# Patient Record
Sex: FEMALE | Race: WHITE | NOT HISPANIC OR LATINO | Employment: UNEMPLOYED | URBAN - METROPOLITAN AREA
[De-identification: names, ages, dates, MRNs, and addresses within clinical notes are randomized per-mention and may not be internally consistent; named-entity substitution may affect disease eponyms.]

---

## 2022-06-08 ENCOUNTER — HOSPITAL ENCOUNTER (EMERGENCY)
Facility: CLINIC | Age: 62
Discharge: HOME | End: 2022-06-08
Attending: EMERGENCY MEDICINE | Admitting: EMERGENCY MEDICINE
Payer: COMMERCIAL

## 2022-06-08 ENCOUNTER — APPOINTMENT (OUTPATIENT)
Dept: GENERAL RADIOLOGY | Facility: CLINIC | Age: 62
End: 2022-06-08
Attending: EMERGENCY MEDICINE
Payer: COMMERCIAL

## 2022-06-08 VITALS
HEART RATE: 84 BPM | TEMPERATURE: 98.3 F | OXYGEN SATURATION: 97 % | WEIGHT: 169.4 LBS | DIASTOLIC BLOOD PRESSURE: 100 MMHG | SYSTOLIC BLOOD PRESSURE: 135 MMHG | RESPIRATION RATE: 26 BRPM
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
## 2022-06-08 DIAGNOSIS — S61.411A LACERATION OF RIGHT HAND WITHOUT FOREIGN BODY, INITIAL ENCOUNTER: ICD-10-CM

## 2022-06-08 PROCEDURE — 73130 X-RAY EXAM OF HAND: CPT | Mod: RT

## 2022-06-08 PROCEDURE — 12002 RPR S/N/AX/GEN/TRNK2.6-7.5CM: CPT | Performed by: EMERGENCY MEDICINE

## 2022-06-08 PROCEDURE — 99284 EMERGENCY DEPT VISIT MOD MDM: CPT | Mod: 25

## 2022-06-08 PROCEDURE — 99284 EMERGENCY DEPT VISIT MOD MDM: CPT | Mod: 25 | Performed by: EMERGENCY MEDICINE

## 2022-06-08 PROCEDURE — 12002 RPR S/N/AX/GEN/TRNK2.6-7.5CM: CPT

## 2022-06-08 RX ORDER — HYDROCODONE BITARTRATE AND ACETAMINOPHEN 5; 325 MG/1; MG/1
1 TABLET ORAL EVERY 6 HOURS PRN
Qty: 10 TABLET | Refills: 0 | Status: SHIPPED | OUTPATIENT
Start: 2022-06-08 | End: 2022-06-11

## 2022-06-08 RX ORDER — GINSENG 100 MG
CAPSULE ORAL
Status: DISCONTINUED
Start: 2022-06-08 | End: 2022-06-08 | Stop reason: HOSPADM

## 2022-06-08 RX ORDER — FLUCONAZOLE 150 MG/1
150 TABLET ORAL ONCE
Qty: 2 TABLET | Refills: 0 | Status: SHIPPED | OUTPATIENT
Start: 2022-06-08 | End: 2022-06-08

## 2022-06-09 NOTE — DISCHARGE INSTRUCTIONS
Your x-ray did not show any underlying broken bones or foreign bodies    Only a few stitches were placed so that the wound is not completely gaping open, but we do not want to closely approximate any animal bites.  They have a very high chance of getting infected and the wound still needs to be able to drain.    You were initiated on antibiotics today.  Complete this course even if you begin to feel better.  You will need to take 1 tablet of the Augmentin morning and evening for a total of 10 days    A prescription for Diflucan was also sent to the [facility] machine.  You may take 1 tablet at the beginning of your antibiotic regimen and take 1 tablet after 3 to 5 days    You may take Tylenol or ibuprofen per bottle instructions as needed for pain    You should follow-up with your primary provider or at an urgent care along your travel route to have the stitches removed in approximately 7 days

## 2022-06-09 NOTE — ED PROVIDER NOTES
History     Chief Complaint   Patient presents with     Dog Bite     HPI  Marie Anderson is a 61 year old female who presents to the emergency room for dog bite to her right hand.  She is visiting the area from out of town, and has a sister who lives nearby.  Had just gotten to her sisters house, and sister has 2 dogs.  She was letting the dogs out to greet Marie when one of the dogs lunged and bit him in the right hand.  Dog is up-to-date on vaccinations.  Marie is up-to-date on her tetanus vaccine.  She is right-hand dominant.  She was concerned about the location of the bite and thought that it might need a few stitches.    Allergies:  Allergies   Allergen Reactions     Oxycodone Rash       Problem List:    There are no problems to display for this patient.       Past Medical History:    History reviewed. No pertinent past medical history.    Past Surgical History:    History reviewed. No pertinent surgical history.    Family History:    History reviewed. No pertinent family history.    Social History:  Marital Status:   [2]  Social History     Tobacco Use     Smoking status: Never Smoker     Smokeless tobacco: Never Used   Substance Use Topics     Alcohol use: Yes     Alcohol/week: 3.0 standard drinks     Types: 3 Glasses of wine per week     Drug use: Never        Medications:    amoxicillin-clavulanate (AUGMENTIN) 875-125 MG tablet  HYDROcodone-acetaminophen (NORCO) 5-325 MG tablet          Review of Systems   All other systems reviewed and are negative.      Physical Exam   BP: (!) 135/100  Pulse: 84  Temp: 98.3  F (36.8  C)  Resp: 26  Weight: 76.8 kg (169 lb 6.4 oz)  SpO2: 97 %      Physical Exam  Vitals and nursing note reviewed.   Constitutional:       General: She is not in acute distress.     Appearance: She is not diaphoretic.   HENT:      Head: Atraumatic.   Eyes:      General: No scleral icterus.     Pupils: Pupils are equal, round, and reactive to light.   Cardiovascular:      Pulses: Normal pulses.    Pulmonary:      Effort: Pulmonary effort is normal.   Musculoskeletal:         General: No tenderness. Normal range of motion.   Skin:     General: Skin is warm.      Comments: See photo below   Neurological:      Mental Status: She is alert.             ED Course                 [facility]    -Laceration Repair    Date/Time: 6/8/2022 8:56 PM  Performed by: Lizzette Christie DO  Authorized by: Lizzette Christie DO     Risks, benefits and alternatives discussed.      ANESTHESIA (see MAR for exact dosages):     Anesthesia method:  Local infiltration    Local anesthetic:  Lidocaine 1% w/o epi  LACERATION DETAILS     Location:  Hand    Hand location:  R palm    Length (cm):  3    Depth (mm):  10    REPAIR TYPE:     Repair type:  Simple      EXPLORATION:     Wound exploration: wound explored through full range of motion and entire depth of wound probed and visualized      Contaminated: yes      TREATMENT:     Area cleansed with:  Betadine    Amount of cleaning:  Extensive    Irrigation solution:  Sterile saline    Irrigation method:  Pressure wash    SKIN REPAIR     Repair method:  Sutures    Suture size:  4-0    Suture material:  Nylon    Suture technique:  Simple interrupted    Number of sutures:  2    APPROXIMATION     Approximation:  Loose    POST-PROCEDURE DETAILS     Dressing:  Antibiotic ointment and non-adherent dressing        PROCEDURE    Patient Tolerance:  Patient tolerated the procedure well with no immediate complications                Critical Care time:  none               Results for orders placed or performed during the hospital encounter of 06/08/22 (from the past 24 hour(s))   XR Hand Right G/E 3 Views    Narrative    EXAM: XR HAND RT G/E 3 VW  LOCATION: [facility]  DATE/TIME: 6/8/2022 8:00 PM    INDICATION: Dog bite, laceration  COMPARISON: None.      Impression    IMPRESSION: No acute fracture. No radiopaque foreign  body. 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       Medications - No data to display    Assessments & Plan (with Medical Decision Making)  Marie is a 61-year-old female presenting to the emergency room with dog bite to her right hand.  See history and focused physical exam as above  Pleasant 61-year-old female in no acute distress, is hypertensive with blood pressure of 135/100 but the remainder of her vital signs are stable.  No obvious deformity of her hand.  Has normal cap refill and normal pulses.  Very minimal bleeding but does have a jagged laceration in the webbing over the palmar aspect between her thumb and index finger.  Do not see any underlying foreign body.  Is up-to-date on her tetanus shot.  We will get an x-ray to make sure there is no underlying fracture, and no embedded foreign body.  We will then repair loosely with a few sutures, as I did warn her that we do not want to tightly close any wounds made from an animal bite due to very high risk of infection.  We will also start her on an antibiotic.  She is agreeable to this plan  X-ray did not reveal any acute fracture or radiopaque foreign body.  Wound was copiously irrigated with sterile saline.  See procedure note above for repair details.  Patient tolerated this well with no immediate complications.  Advised on wound care and appropriate follow-up.  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  Told her to stop at any urgent care or emergency room along her route if she has any signs of infection despite taking antibiotics, or if she has any concerns with removing sutures.  Antibiotics and Diflucan were sent to the [facility] machine for the patient to take with tonight.  Also wrote a prescription for a small quantity of Norco for acute severe pain to utilize if  desired.  All questions were answered and she was discharged in no distress     I have reviewed the nursing notes.    I have reviewed the findings, diagnosis, plan and need for follow up with the patient.       Discharge Medication List as of 6/8/2022  9:14 PM      START taking these medications    Details   amoxicillin-clavulanate (AUGMENTIN) 875-125 MG tablet Take 1 tablet by mouth 2 times daily, Disp-20 tablet, R-0, [facility]      fluconazole (DIFLUCAN) 150 MG tablet Take 1 tablet (150 mg) by mouth once for 1 dose, Disp-2 tablet, R-0, [facility]      HYDROcodone-acetaminophen (NORCO) 5-325 MG tablet Take 1 tablet by mouth every 6 hours as needed for severe pain, Disp-10 tablet, R-0, [facility]             Final diagnoses:   Dog bite, initial encounter   Laceration of right hand without foreign body, initial encounter       6/8/2022   [facility] EMERGENCY DEPT     Lizzette Christie DO  06/09/22 [id number]